# Patient Record
Sex: MALE | Race: WHITE | NOT HISPANIC OR LATINO | Employment: OTHER | URBAN - METROPOLITAN AREA
[De-identification: names, ages, dates, MRNs, and addresses within clinical notes are randomized per-mention and may not be internally consistent; named-entity substitution may affect disease eponyms.]

---

## 2019-12-01 PROBLEM — M15.0 PRIMARY OSTEOARTHRITIS INVOLVING MULTIPLE JOINTS: Status: ACTIVE | Noted: 2019-12-01

## 2019-12-01 PROBLEM — F43.10 PTSD (POST-TRAUMATIC STRESS DISORDER): Status: ACTIVE | Noted: 2019-12-01

## 2019-12-01 PROBLEM — F33.1 MODERATE EPISODE OF RECURRENT MAJOR DEPRESSIVE DISORDER: Chronic | Status: ACTIVE | Noted: 2019-12-01

## 2019-12-01 PROBLEM — R79.89 LOW SERUM TESTOSTERONE: Status: ACTIVE | Noted: 2019-12-01

## 2019-12-01 PROBLEM — G89.29 CHRONIC NECK PAIN: Status: ACTIVE | Noted: 2019-12-01

## 2019-12-01 PROBLEM — M15.9 PRIMARY OSTEOARTHRITIS INVOLVING MULTIPLE JOINTS: Status: ACTIVE | Noted: 2019-12-01

## 2019-12-01 PROBLEM — G89.29 CHRONIC BILATERAL LOW BACK PAIN WITHOUT SCIATICA: Status: ACTIVE | Noted: 2019-12-01

## 2019-12-01 PROBLEM — E11.42 TYPE 2 DIABETES MELLITUS WITH DIABETIC POLYNEUROPATHY, WITHOUT LONG-TERM CURRENT USE OF INSULIN: Status: ACTIVE | Noted: 2019-12-01

## 2019-12-01 PROBLEM — M51.369 DEGENERATION OF LUMBAR INTERVERTEBRAL DISC: Status: ACTIVE | Noted: 2019-12-01

## 2019-12-01 PROBLEM — K21.00 GASTROESOPHAGEAL REFLUX DISEASE WITH ESOPHAGITIS: Status: ACTIVE | Noted: 2019-12-01

## 2019-12-01 PROBLEM — M54.2 CHRONIC NECK PAIN: Status: ACTIVE | Noted: 2019-12-01

## 2019-12-01 PROBLEM — M51.36 DEGENERATION OF LUMBAR INTERVERTEBRAL DISC: Status: ACTIVE | Noted: 2019-12-01

## 2019-12-01 PROBLEM — I10 ESSENTIAL HYPERTENSION: Status: ACTIVE | Noted: 2019-12-01

## 2019-12-01 PROBLEM — M54.50 CHRONIC BILATERAL LOW BACK PAIN WITHOUT SCIATICA: Status: ACTIVE | Noted: 2019-12-01

## 2020-01-08 PROBLEM — R79.89 LOW TESTOSTERONE: Status: ACTIVE | Noted: 2020-01-08

## 2020-02-11 PROBLEM — M25.522 LEFT ELBOW PAIN: Status: ACTIVE | Noted: 2020-02-11

## 2020-02-11 PROBLEM — M25.512 CHRONIC LEFT SHOULDER PAIN: Status: ACTIVE | Noted: 2020-02-11

## 2020-02-11 PROBLEM — G89.29 CHRONIC LEFT SHOULDER PAIN: Status: ACTIVE | Noted: 2020-02-11

## 2020-02-11 PROBLEM — M54.2 CERVICAL SPINE PAIN: Status: ACTIVE | Noted: 2020-02-11

## 2020-03-04 PROBLEM — L60.3 NAIL DYSTROPHY: Status: ACTIVE | Noted: 2017-05-25

## 2020-03-04 PROBLEM — R41.3 MEMORY LOSS: Status: ACTIVE | Noted: 2020-03-04

## 2022-01-03 ENCOUNTER — PATIENT OUTREACH (OUTPATIENT)
Dept: ADMINISTRATIVE | Facility: HOSPITAL | Age: 57
End: 2022-01-03
Payer: MEDICARE

## 2022-02-10 LAB
LEFT EYE DM RETINOPATHY: NEGATIVE
LEFT EYE DM RETINOPATHY: NEGATIVE
RIGHT EYE DM RETINOPATHY: NEGATIVE
RIGHT EYE DM RETINOPATHY: NEGATIVE

## 2022-03-03 PROBLEM — E11.65 TYPE 2 DIABETES MELLITUS WITH HYPERGLYCEMIA: Status: ACTIVE | Noted: 2018-06-01

## 2022-03-03 PROBLEM — Z99.81 REQUIRES SUPPLEMENTAL OXYGEN: Status: ACTIVE | Noted: 2021-01-13

## 2022-03-03 PROBLEM — E11.8 COMPLICATION OF DIABETES MELLITUS: Status: ACTIVE | Noted: 2019-01-11

## 2022-03-03 PROBLEM — F11.20 CONTINUOUS OPIOID DEPENDENCE: Status: ACTIVE | Noted: 2019-01-11

## 2022-03-03 PROBLEM — E78.5 HYPERLIPIDEMIA, UNSPECIFIED: Status: ACTIVE | Noted: 2017-06-05

## 2022-03-03 PROBLEM — Z72.0 TOBACCO USE: Status: ACTIVE | Noted: 2017-06-05

## 2022-03-03 PROBLEM — G47.33 OBSTRUCTIVE SLEEP APNEA: Status: ACTIVE | Noted: 2017-06-05

## 2022-03-03 PROBLEM — J96.10 CHRONIC RESPIRATORY FAILURE, UNSP W HYPOXIA OR HYPERCAPNIA: Status: ACTIVE | Noted: 2021-01-13

## 2022-03-03 PROBLEM — R26.2 DIFFICULTY IN WALKING, NOT ELSEWHERE CLASSIFIED: Status: ACTIVE | Noted: 2021-01-13

## 2022-04-18 ENCOUNTER — PATIENT OUTREACH (OUTPATIENT)
Dept: ADMINISTRATIVE | Facility: HOSPITAL | Age: 57
End: 2022-04-18
Payer: MEDICARE

## 2022-05-12 PROBLEM — M79.642 LEFT HAND PAIN: Status: ACTIVE | Noted: 2022-05-12

## 2022-05-12 PROBLEM — S63.92XA HAND SPRAIN, LEFT, INITIAL ENCOUNTER: Status: ACTIVE | Noted: 2022-05-12

## 2022-09-01 ENCOUNTER — PATIENT OUTREACH (OUTPATIENT)
Dept: ADMINISTRATIVE | Facility: HOSPITAL | Age: 57
End: 2022-09-01
Payer: MEDICARE

## 2022-12-19 PROBLEM — E11.65 TYPE 2 DIABETES MELLITUS WITH HYPERGLYCEMIA: Status: RESOLVED | Noted: 2018-06-01 | Resolved: 2022-12-19

## 2023-03-10 ENCOUNTER — PATIENT MESSAGE (OUTPATIENT)
Dept: ADMINISTRATIVE | Facility: HOSPITAL | Age: 58
End: 2023-03-10
Payer: MEDICARE

## 2023-03-22 PROBLEM — R91.1 PULMONARY NODULE: Status: ACTIVE | Noted: 2023-03-22

## 2023-03-22 PROBLEM — Z77.090 HISTORY OF ASBESTOS EXPOSURE: Status: ACTIVE | Noted: 2023-03-22

## 2024-04-24 ENCOUNTER — PATIENT MESSAGE (OUTPATIENT)
Dept: ADMINISTRATIVE | Facility: HOSPITAL | Age: 59
End: 2024-04-24
Payer: MEDICAID

## 2024-04-24 ENCOUNTER — PATIENT OUTREACH (OUTPATIENT)
Dept: ADMINISTRATIVE | Facility: HOSPITAL | Age: 59
End: 2024-04-24
Payer: MEDICAID

## 2024-04-24 DIAGNOSIS — Z12.12 SCREENING FOR COLORECTAL CANCER: Primary | ICD-10-CM

## 2024-04-24 DIAGNOSIS — Z12.11 SCREENING FOR COLORECTAL CANCER: Primary | ICD-10-CM

## 2025-04-01 ENCOUNTER — PATIENT OUTREACH (OUTPATIENT)
Facility: OTHER | Age: 60
End: 2025-04-01

## 2025-04-02 NOTE — PROGRESS NOTES
Pt visited the ED on 3/31/25. I made 2 attempts to reach patient to assist with scheduling a post ED 7-day follow up with PCP. Unable to reach.  Closing Encounter.    Sari De Santiago